# Patient Record
(demographics unavailable — no encounter records)

---

## 2024-11-07 NOTE — PHYSICAL EXAM
[Alert] : alert [No Respiratory Distress] : no respiratory distress [No Accessory Muscle Use] : no accessory muscle use [Clear to Auscultation] : lungs were clear to auscultation bilaterally [Normal S1, S2] : normal S1 and S2 [de-identified] : Enlarged thyroid bilaterally with nodule felt mainly on the right side

## 2024-11-07 NOTE — ASSESSMENT
[FreeTextEntry1] : Thyroid nodules  RLP 2.6 iso previously biopsied and increased in size  LMP 1.7 iso  Both need FNA  Had biopsy done in the past in 2021, will try to obtain from the PCP   Has 2 cousins with thyroid cancer, she will ask and let us know what type of thyroid cancer   Obesity Low carb diet and exercise    I spent 40 minutes discussing with patient face to face and non-face to face reviewing documentations, labs, and/or imaging, also discussing the management plans.  RTC 3 months for 20 min  Addendum  Received the report for RLP biopsy in 2021 and was benign

## 2024-11-07 NOTE — HISTORY OF PRESENT ILLNESS
[FreeTextEntry1] : Here for thyroid nodule   RLP 2.6 iso previously biopsied and increased in size  LMP 1.7 iso   [place] history of [place] in [place], but does not have access   Has 2 cousins with thyroid cancer

## 2024-11-07 NOTE — PHYSICAL EXAM
[Alert] : alert [No Respiratory Distress] : no respiratory distress [No Accessory Muscle Use] : no accessory muscle use [Clear to Auscultation] : lungs were clear to auscultation bilaterally [Normal S1, S2] : normal S1 and S2 [de-identified] : Enlarged thyroid bilaterally with nodule felt mainly on the right side

## 2024-11-07 NOTE — HISTORY OF PRESENT ILLNESS
[FreeTextEntry1] : Here for thyroid nodule   RLP 2.6 iso previously biopsied and increased in size  LMP 1.7 iso   Has history of biopsy in 2021 in [place], but does not have access   Has 2 cousins with thyroid cancer

## 2025-01-27 NOTE — PHYSICAL EXAM
[Alert] : alert [No Respiratory Distress] : no respiratory distress [No Accessory Muscle Use] : no accessory muscle use [Clear to Auscultation] : lungs were clear to auscultation bilaterally [Normal S1, S2] : normal S1 and S2 [de-identified] : Enlarged thyroid bilaterally with nodule felt mainly on the right side

## 2025-01-27 NOTE — ASSESSMENT
[FreeTextEntry1] : Thyroid nodules  RLP Benign findings, previously biopsied benign LMP 1.7 iso benign Repeat thyroid US 12/2025. Discussed the risk of cancer, we will not repeat FNA for the RLP and watch LMP yearsly   11/2024 TPO positive showing picture of Hashimotos but TFT is normal so no treatment for now Repeat TFT in 6 months but appointment in a year    Obesity Low carb diet and exercise    I spent 20 minutes discussing with patient face to face and non-face to face reviewing documentations, labs, and/or imaging, also discussing the management plans.  RTC in a year for 20 min

## 2025-01-27 NOTE — HISTORY OF PRESENT ILLNESS
[FreeTextEntry1] : Here for thyroid nodule   RLP 2.6 iso previously biopsied and increased in size  LMP 1.7 iso   Has history of biopsy in 2021 in [place], but does not have access   Has 2 cousins with thyroid cancer  12/2024 RLP 2.6 Benign findings, previously biopsied benign LMP 1.7 iso benign Repeat thyroid US 12/2025.